# Patient Record
Sex: MALE | Race: WHITE | Employment: UNEMPLOYED | URBAN - METROPOLITAN AREA
[De-identification: names, ages, dates, MRNs, and addresses within clinical notes are randomized per-mention and may not be internally consistent; named-entity substitution may affect disease eponyms.]

---

## 2019-02-18 ENCOUNTER — NURSE ONLY (OUTPATIENT)
Dept: FAMILY MEDICINE CLINIC | Age: 49
End: 2019-02-18

## 2019-02-18 DIAGNOSIS — Z02.83 ENCOUNTER FOR DRUG SCREENING: Primary | ICD-10-CM

## 2019-02-18 PROCEDURE — WM1004 WORKMED DRUG COLLECTION: Performed by: NURSE PRACTITIONER

## 2020-05-14 ENCOUNTER — APPOINTMENT (OUTPATIENT)
Dept: CT IMAGING | Age: 50
DRG: 638 | End: 2020-05-14
Payer: COMMERCIAL

## 2020-05-14 ENCOUNTER — HOSPITAL ENCOUNTER (INPATIENT)
Age: 50
LOS: 2 days | Discharge: HOME | DRG: 638 | End: 2020-05-16
Attending: EMERGENCY MEDICINE | Admitting: INTERNAL MEDICINE
Payer: COMMERCIAL

## 2020-05-14 ENCOUNTER — APPOINTMENT (OUTPATIENT)
Dept: GENERAL RADIOLOGY | Age: 50
DRG: 638 | End: 2020-05-14
Payer: COMMERCIAL

## 2020-05-14 PROBLEM — R79.89 ELEVATED LACTIC ACID LEVEL: Status: ACTIVE | Noted: 2020-05-14

## 2020-05-14 PROBLEM — E11.9 TYPE 2 DIABETES MELLITUS WITHOUT COMPLICATION, WITHOUT LONG-TERM CURRENT USE OF INSULIN (HCC): Status: ACTIVE | Noted: 2020-05-14

## 2020-05-14 PROBLEM — E87.6 HYPOKALEMIA: Status: ACTIVE | Noted: 2020-05-14

## 2020-05-14 PROBLEM — I10 ESSENTIAL HYPERTENSION: Status: ACTIVE | Noted: 2020-05-14

## 2020-05-14 PROBLEM — R55 SYNCOPAL EPISODES: Status: ACTIVE | Noted: 2020-05-14

## 2020-05-14 PROBLEM — R56.9 SEIZURE (HCC): Status: ACTIVE | Noted: 2020-05-14

## 2020-05-14 PROBLEM — R77.8 ELEVATED TROPONIN: Status: ACTIVE | Noted: 2020-05-14

## 2020-05-14 LAB
-: ABNORMAL
ABSOLUTE EOS #: 0 K/UL (ref 0–0.4)
ABSOLUTE IMMATURE GRANULOCYTE: 0.12 K/UL (ref 0–0.3)
ABSOLUTE LYMPH #: 4.48 K/UL (ref 1–4.8)
ABSOLUTE MONO #: 0.24 K/UL (ref 0.1–0.8)
ACETAMINOPHEN LEVEL: <5 UG/ML (ref 10–30)
AMORPHOUS: ABNORMAL
ANION GAP SERPL CALCULATED.3IONS-SCNC: 21 MMOL/L (ref 9–17)
ATYPICAL LYMPHOCYTE ABSOLUTE COUNT: 0.24 K/UL
ATYPICAL LYMPHOCYTES: 2 %
BACTERIA: ABNORMAL
BASOPHILS # BLD: 0 % (ref 0–2)
BASOPHILS ABSOLUTE: 0 K/UL (ref 0–0.2)
BILIRUBIN URINE: NEGATIVE
BNP INTERPRETATION: NORMAL
BUN BLDV-MCNC: 13 MG/DL (ref 6–20)
BUN/CREAT BLD: ABNORMAL (ref 9–20)
CALCIUM IONIZED: 1.08 MMOL/L (ref 1.13–1.33)
CALCIUM SERPL-MCNC: 8.7 MG/DL (ref 8.6–10.4)
CASTS UA: ABNORMAL /LPF (ref 0–8)
CHLORIDE BLD-SCNC: 101 MMOL/L (ref 98–107)
CO2: 18 MMOL/L (ref 20–31)
COLOR: YELLOW
CREAT SERPL-MCNC: 0.94 MG/DL (ref 0.7–1.2)
CRYSTALS, UA: ABNORMAL /HPF
DIFFERENTIAL TYPE: ABNORMAL
EOSINOPHILS RELATIVE PERCENT: 0 % (ref 1–4)
EPITHELIAL CELLS UA: ABNORMAL /HPF (ref 0–5)
ETHANOL PERCENT: <0.01 %
ETHANOL: <10 MG/DL
GFR AFRICAN AMERICAN: >60 ML/MIN
GFR NON-AFRICAN AMERICAN: >60 ML/MIN
GFR SERPL CREATININE-BSD FRML MDRD: ABNORMAL ML/MIN/{1.73_M2}
GFR SERPL CREATININE-BSD FRML MDRD: ABNORMAL ML/MIN/{1.73_M2}
GLUCOSE BLD-MCNC: 111 MG/DL (ref 70–99)
GLUCOSE BLD-MCNC: 99 MG/DL (ref 75–110)
GLUCOSE URINE: NEGATIVE
HCT VFR BLD CALC: 46.6 % (ref 40.7–50.3)
HEMOGLOBIN: 15 G/DL (ref 13–17)
IMMATURE GRANULOCYTES: 1 %
KETONES, URINE: ABNORMAL
LACTIC ACID, WHOLE BLOOD: 5.1 MMOL/L (ref 0.7–2.1)
LACTIC ACID: ABNORMAL MMOL/L
LEUKOCYTE ESTERASE, URINE: NEGATIVE
LYMPHOCYTES # BLD: 37 % (ref 24–44)
MAGNESIUM: 1.7 MG/DL (ref 1.6–2.6)
MCH RBC QN AUTO: 27.9 PG (ref 25.2–33.5)
MCHC RBC AUTO-ENTMCNC: 32.2 G/DL (ref 28.4–34.8)
MCV RBC AUTO: 86.8 FL (ref 82.6–102.9)
MONOCYTES # BLD: 2 % (ref 1–7)
MORPHOLOGY: NORMAL
MUCUS: ABNORMAL
NITRITE, URINE: NEGATIVE
NRBC AUTOMATED: 0 PER 100 WBC
OTHER OBSERVATIONS UA: ABNORMAL
PDW BLD-RTO: 14.3 % (ref 11.8–14.4)
PH UA: 5 (ref 5–8)
PLATELET # BLD: 196 K/UL (ref 138–453)
PLATELET ESTIMATE: ABNORMAL
PMV BLD AUTO: 11.4 FL (ref 8.1–13.5)
POTASSIUM SERPL-SCNC: 3.6 MMOL/L (ref 3.7–5.3)
PRO-BNP: 44 PG/ML
PROTEIN UA: ABNORMAL
RBC # BLD: 5.37 M/UL (ref 4.21–5.77)
RBC # BLD: ABNORMAL 10*6/UL
RBC UA: ABNORMAL /HPF (ref 0–4)
RENAL EPITHELIAL, UA: ABNORMAL /HPF
SALICYLATE LEVEL: <1 MG/DL (ref 3–10)
SEG NEUTROPHILS: 58 % (ref 36–66)
SEGMENTED NEUTROPHILS ABSOLUTE COUNT: 7.02 K/UL (ref 1.8–7.7)
SODIUM BLD-SCNC: 140 MMOL/L (ref 135–144)
SPECIFIC GRAVITY UA: 1.04 (ref 1–1.03)
TOXIC TRICYCLIC SC,BLOOD: NEGATIVE
TRICHOMONAS: ABNORMAL
TROPONIN INTERP: ABNORMAL
TROPONIN INTERP: NORMAL
TROPONIN T: ABNORMAL NG/ML
TROPONIN T: NORMAL NG/ML
TROPONIN, HIGH SENSITIVITY: 18 NG/L (ref 0–22)
TROPONIN, HIGH SENSITIVITY: 43 NG/L (ref 0–22)
TSH SERPL DL<=0.05 MIU/L-ACNC: 3.63 MIU/L (ref 0.3–5)
TURBIDITY: CLEAR
URINE HGB: NEGATIVE
UROBILINOGEN, URINE: NORMAL
WBC # BLD: 12.1 K/UL (ref 3.5–11.3)
WBC # BLD: ABNORMAL 10*3/UL
WBC UA: ABNORMAL /HPF (ref 0–5)
YEAST: ABNORMAL

## 2020-05-14 PROCEDURE — G0480 DRUG TEST DEF 1-7 CLASSES: HCPCS

## 2020-05-14 PROCEDURE — 83930 ASSAY OF BLOOD OSMOLALITY: CPT

## 2020-05-14 PROCEDURE — 81001 URINALYSIS AUTO W/SCOPE: CPT

## 2020-05-14 PROCEDURE — 80076 HEPATIC FUNCTION PANEL: CPT

## 2020-05-14 PROCEDURE — 83605 ASSAY OF LACTIC ACID: CPT

## 2020-05-14 PROCEDURE — 80048 BASIC METABOLIC PNL TOTAL CA: CPT

## 2020-05-14 PROCEDURE — 6360000004 HC RX CONTRAST MEDICATION: Performed by: STUDENT IN AN ORGANIZED HEALTH CARE EDUCATION/TRAINING PROGRAM

## 2020-05-14 PROCEDURE — 83735 ASSAY OF MAGNESIUM: CPT

## 2020-05-14 PROCEDURE — 82330 ASSAY OF CALCIUM: CPT

## 2020-05-14 PROCEDURE — 85025 COMPLETE CBC W/AUTO DIFF WBC: CPT

## 2020-05-14 PROCEDURE — 2580000003 HC RX 258: Performed by: STUDENT IN AN ORGANIZED HEALTH CARE EDUCATION/TRAINING PROGRAM

## 2020-05-14 PROCEDURE — 84484 ASSAY OF TROPONIN QUANT: CPT

## 2020-05-14 PROCEDURE — 71046 X-RAY EXAM CHEST 2 VIEWS: CPT

## 2020-05-14 PROCEDURE — 1200000000 HC SEMI PRIVATE

## 2020-05-14 PROCEDURE — 82947 ASSAY GLUCOSE BLOOD QUANT: CPT

## 2020-05-14 PROCEDURE — 93005 ELECTROCARDIOGRAM TRACING: CPT | Performed by: STUDENT IN AN ORGANIZED HEALTH CARE EDUCATION/TRAINING PROGRAM

## 2020-05-14 PROCEDURE — 84443 ASSAY THYROID STIM HORMONE: CPT

## 2020-05-14 PROCEDURE — 80307 DRUG TEST PRSMV CHEM ANLYZR: CPT

## 2020-05-14 PROCEDURE — 6360000002 HC RX W HCPCS: Performed by: STUDENT IN AN ORGANIZED HEALTH CARE EDUCATION/TRAINING PROGRAM

## 2020-05-14 PROCEDURE — 96374 THER/PROPH/DIAG INJ IV PUSH: CPT

## 2020-05-14 PROCEDURE — 99285 EMERGENCY DEPT VISIT HI MDM: CPT

## 2020-05-14 PROCEDURE — 70450 CT HEAD/BRAIN W/O DYE: CPT

## 2020-05-14 PROCEDURE — 71260 CT THORAX DX C+: CPT

## 2020-05-14 PROCEDURE — 83880 ASSAY OF NATRIURETIC PEPTIDE: CPT

## 2020-05-14 RX ORDER — ACETAMINOPHEN 325 MG/1
650 TABLET ORAL EVERY 4 HOURS PRN
Status: CANCELLED | OUTPATIENT
Start: 2020-05-14

## 2020-05-14 RX ORDER — ONDANSETRON 4 MG/1
8 TABLET, ORALLY DISINTEGRATING ORAL EVERY 8 HOURS PRN
Status: CANCELLED | OUTPATIENT
Start: 2020-05-14

## 2020-05-14 RX ORDER — ONDANSETRON 2 MG/ML
4 INJECTION INTRAMUSCULAR; INTRAVENOUS ONCE
Status: COMPLETED | OUTPATIENT
Start: 2020-05-14 | End: 2020-05-14

## 2020-05-14 RX ORDER — SODIUM CHLORIDE 0.9 % (FLUSH) 0.9 %
10 SYRINGE (ML) INJECTION PRN
Status: CANCELLED | OUTPATIENT
Start: 2020-05-14

## 2020-05-14 RX ORDER — SODIUM CHLORIDE 0.9 % (FLUSH) 0.9 %
10 SYRINGE (ML) INJECTION EVERY 12 HOURS SCHEDULED
Status: CANCELLED | OUTPATIENT
Start: 2020-05-14

## 2020-05-14 RX ORDER — 0.9 % SODIUM CHLORIDE 0.9 %
1000 INTRAVENOUS SOLUTION INTRAVENOUS ONCE
Status: COMPLETED | OUTPATIENT
Start: 2020-05-14 | End: 2020-05-15

## 2020-05-14 RX ADMIN — IOHEXOL 75 ML: 350 INJECTION, SOLUTION INTRAVENOUS at 22:11

## 2020-05-14 RX ADMIN — ONDANSETRON 4 MG: 2 INJECTION INTRAMUSCULAR; INTRAVENOUS at 23:10

## 2020-05-14 RX ADMIN — SODIUM CHLORIDE 1000 ML: 9 INJECTION, SOLUTION INTRAVENOUS at 22:17

## 2020-05-15 ENCOUNTER — APPOINTMENT (OUTPATIENT)
Dept: MRI IMAGING | Age: 50
DRG: 638 | End: 2020-05-15
Payer: COMMERCIAL

## 2020-05-15 LAB
ABSOLUTE EOS #: <0.03 K/UL (ref 0–0.44)
ABSOLUTE IMMATURE GRANULOCYTE: 0.04 K/UL (ref 0–0.3)
ABSOLUTE LYMPH #: 2.27 K/UL (ref 1.1–3.7)
ABSOLUTE MONO #: 0.69 K/UL (ref 0.1–1.2)
ALBUMIN SERPL-MCNC: 3.8 G/DL (ref 3.5–5.2)
ALBUMIN/GLOBULIN RATIO: 1.2 (ref 1–2.5)
ALP BLD-CCNC: 102 U/L (ref 40–129)
ALT SERPL-CCNC: 32 U/L (ref 5–41)
AMPHETAMINE SCREEN URINE: NEGATIVE
ANION GAP SERPL CALCULATED.3IONS-SCNC: 15 MMOL/L (ref 9–17)
AST SERPL-CCNC: 34 U/L
BARBITURATE SCREEN URINE: NEGATIVE
BASOPHILS # BLD: 0 % (ref 0–2)
BASOPHILS ABSOLUTE: <0.03 K/UL (ref 0–0.2)
BENZODIAZEPINE SCREEN, URINE: NEGATIVE
BILIRUB SERPL-MCNC: 0.35 MG/DL (ref 0.3–1.2)
BILIRUBIN DIRECT: 0.1 MG/DL
BILIRUBIN, INDIRECT: 0.25 MG/DL (ref 0–1)
BUN BLDV-MCNC: 12 MG/DL (ref 6–20)
BUN/CREAT BLD: ABNORMAL (ref 9–20)
BUPRENORPHINE URINE: NORMAL
CALCIUM SERPL-MCNC: 8.6 MG/DL (ref 8.6–10.4)
CANNABINOID SCREEN URINE: NEGATIVE
CHLORIDE BLD-SCNC: 106 MMOL/L (ref 98–107)
CO2: 21 MMOL/L (ref 20–31)
COCAINE METABOLITE, URINE: NEGATIVE
CORTISOL COLLECTION INFO: NORMAL
CORTISOL: 13.6 UG/DL (ref 2.7–18.4)
CREAT SERPL-MCNC: 0.67 MG/DL (ref 0.7–1.2)
CREATININE URINE: 87.3 MG/DL (ref 39–259)
DIFFERENTIAL TYPE: ABNORMAL
EKG ATRIAL RATE: 106 BPM
EKG ATRIAL RATE: 115 BPM
EKG ATRIAL RATE: 95 BPM
EKG P AXIS: 32 DEGREES
EKG P AXIS: 40 DEGREES
EKG P AXIS: 52 DEGREES
EKG P-R INTERVAL: 136 MS
EKG P-R INTERVAL: 150 MS
EKG P-R INTERVAL: 160 MS
EKG Q-T INTERVAL: 370 MS
EKG Q-T INTERVAL: 374 MS
EKG Q-T INTERVAL: 376 MS
EKG QRS DURATION: 130 MS
EKG QRS DURATION: 134 MS
EKG QRS DURATION: 134 MS
EKG QTC CALCULATION (BAZETT): 472 MS
EKG QTC CALCULATION (BAZETT): 496 MS
EKG QTC CALCULATION (BAZETT): 511 MS
EKG R AXIS: 32 DEGREES
EKG R AXIS: 40 DEGREES
EKG R AXIS: 74 DEGREES
EKG T AXIS: 11 DEGREES
EKG T AXIS: 12 DEGREES
EKG T AXIS: 23 DEGREES
EKG VENTRICULAR RATE: 106 BPM
EKG VENTRICULAR RATE: 115 BPM
EKG VENTRICULAR RATE: 95 BPM
EOSINOPHILS RELATIVE PERCENT: 0 % (ref 1–4)
ESTIMATED AVERAGE GLUCOSE: 220 MG/DL
GFR AFRICAN AMERICAN: >60 ML/MIN
GFR NON-AFRICAN AMERICAN: >60 ML/MIN
GFR SERPL CREATININE-BSD FRML MDRD: ABNORMAL ML/MIN/{1.73_M2}
GFR SERPL CREATININE-BSD FRML MDRD: ABNORMAL ML/MIN/{1.73_M2}
GLOBULIN: NORMAL G/DL (ref 1.5–3.8)
GLUCOSE BLD-MCNC: 133 MG/DL (ref 75–110)
GLUCOSE BLD-MCNC: 165 MG/DL (ref 70–99)
GLUCOSE BLD-MCNC: 169 MG/DL (ref 75–110)
GLUCOSE BLD-MCNC: 216 MG/DL (ref 75–110)
GLUCOSE BLD-MCNC: 223 MG/DL (ref 75–110)
HBA1C MFR BLD: 9.3 % (ref 4–6)
HCT VFR BLD CALC: 44.1 % (ref 40.7–50.3)
HEMOGLOBIN: 14.4 G/DL (ref 13–17)
IMMATURE GRANULOCYTES: 0 %
LACTIC ACID, WHOLE BLOOD: 1.3 MMOL/L (ref 0.7–2.1)
LV EF: 55 %
LVEF MODALITY: NORMAL
LYMPHOCYTES # BLD: 22 % (ref 24–43)
MCH RBC QN AUTO: 28.1 PG (ref 25.2–33.5)
MCHC RBC AUTO-ENTMCNC: 32.7 G/DL (ref 28.4–34.8)
MCV RBC AUTO: 86 FL (ref 82.6–102.9)
MDMA URINE: NORMAL
METHADONE SCREEN, URINE: NEGATIVE
METHAMPHETAMINE, URINE: NORMAL
MONOCYTES # BLD: 7 % (ref 3–12)
NRBC AUTOMATED: 0 PER 100 WBC
OPIATES, URINE: NEGATIVE
OXYCODONE SCREEN URINE: NEGATIVE
PDW BLD-RTO: 14.5 % (ref 11.8–14.4)
PHENCYCLIDINE, URINE: NEGATIVE
PLATELET # BLD: 183 K/UL (ref 138–453)
PLATELET ESTIMATE: ABNORMAL
PMV BLD AUTO: 11.2 FL (ref 8.1–13.5)
POTASSIUM SERPL-SCNC: 4.1 MMOL/L (ref 3.7–5.3)
PROPOXYPHENE, URINE: NORMAL
RBC # BLD: 5.13 M/UL (ref 4.21–5.77)
RBC # BLD: ABNORMAL 10*6/UL
SEG NEUTROPHILS: 71 % (ref 36–65)
SEGMENTED NEUTROPHILS ABSOLUTE COUNT: 7.51 K/UL (ref 1.5–8.1)
SERUM OSMOLALITY: 293 MOSM/KG (ref 275–295)
SODIUM BLD-SCNC: 142 MMOL/L (ref 135–144)
TEST INFORMATION: NORMAL
TOTAL PROTEIN, URINE: 75 MG/DL
TOTAL PROTEIN: 6.9 G/DL (ref 6.4–8.3)
TRICYCLIC ANTIDEPRESSANTS, UR: NORMAL
TROPONIN INTERP: ABNORMAL
TROPONIN INTERP: ABNORMAL
TROPONIN INTERP: NORMAL
TROPONIN T: ABNORMAL NG/ML
TROPONIN T: ABNORMAL NG/ML
TROPONIN T: NORMAL NG/ML
TROPONIN, HIGH SENSITIVITY: 21 NG/L (ref 0–22)
TROPONIN, HIGH SENSITIVITY: 21 NG/L (ref 0–22)
TROPONIN, HIGH SENSITIVITY: 22 NG/L (ref 0–22)
TROPONIN, HIGH SENSITIVITY: 25 NG/L (ref 0–22)
TROPONIN, HIGH SENSITIVITY: 29 NG/L (ref 0–22)
TSH SERPL DL<=0.05 MIU/L-ACNC: 1.22 MIU/L (ref 0.3–5)
URINE TOTAL PROTEIN CREATININE RATIO: 0.86 (ref 0–0.2)
WBC # BLD: 10.5 K/UL (ref 3.5–11.3)
WBC # BLD: ABNORMAL 10*3/UL

## 2020-05-15 PROCEDURE — 2580000003 HC RX 258: Performed by: STUDENT IN AN ORGANIZED HEALTH CARE EDUCATION/TRAINING PROGRAM

## 2020-05-15 PROCEDURE — 6370000000 HC RX 637 (ALT 250 FOR IP): Performed by: STUDENT IN AN ORGANIZED HEALTH CARE EDUCATION/TRAINING PROGRAM

## 2020-05-15 PROCEDURE — 80048 BASIC METABOLIC PNL TOTAL CA: CPT

## 2020-05-15 PROCEDURE — 93306 TTE W/DOPPLER COMPLETE: CPT

## 2020-05-15 PROCEDURE — 97165 OT EVAL LOW COMPLEX 30 MIN: CPT

## 2020-05-15 PROCEDURE — 96372 THER/PROPH/DIAG INJ SC/IM: CPT

## 2020-05-15 PROCEDURE — 95819 EEG AWAKE AND ASLEEP: CPT

## 2020-05-15 PROCEDURE — G0378 HOSPITAL OBSERVATION PER HR: HCPCS

## 2020-05-15 PROCEDURE — 82570 ASSAY OF URINE CREATININE: CPT

## 2020-05-15 PROCEDURE — 84484 ASSAY OF TROPONIN QUANT: CPT

## 2020-05-15 PROCEDURE — 84156 ASSAY OF PROTEIN URINE: CPT

## 2020-05-15 PROCEDURE — 85025 COMPLETE CBC W/AUTO DIFF WBC: CPT

## 2020-05-15 PROCEDURE — 82533 TOTAL CORTISOL: CPT

## 2020-05-15 PROCEDURE — 97535 SELF CARE MNGMENT TRAINING: CPT

## 2020-05-15 PROCEDURE — 93005 ELECTROCARDIOGRAM TRACING: CPT | Performed by: STUDENT IN AN ORGANIZED HEALTH CARE EDUCATION/TRAINING PROGRAM

## 2020-05-15 PROCEDURE — 6360000004 HC RX CONTRAST MEDICATION: Performed by: STUDENT IN AN ORGANIZED HEALTH CARE EDUCATION/TRAINING PROGRAM

## 2020-05-15 PROCEDURE — 84443 ASSAY THYROID STIM HORMONE: CPT

## 2020-05-15 PROCEDURE — 95819 EEG AWAKE AND ASLEEP: CPT | Performed by: PSYCHIATRY & NEUROLOGY

## 2020-05-15 PROCEDURE — 36415 COLL VENOUS BLD VENIPUNCTURE: CPT

## 2020-05-15 PROCEDURE — A9579 GAD-BASE MR CONTRAST NOS,1ML: HCPCS | Performed by: STUDENT IN AN ORGANIZED HEALTH CARE EDUCATION/TRAINING PROGRAM

## 2020-05-15 PROCEDURE — 6360000002 HC RX W HCPCS: Performed by: STUDENT IN AN ORGANIZED HEALTH CARE EDUCATION/TRAINING PROGRAM

## 2020-05-15 PROCEDURE — 82947 ASSAY GLUCOSE BLOOD QUANT: CPT

## 2020-05-15 PROCEDURE — 83036 HEMOGLOBIN GLYCOSYLATED A1C: CPT

## 2020-05-15 PROCEDURE — 70553 MRI BRAIN STEM W/O & W/DYE: CPT

## 2020-05-15 PROCEDURE — 99223 1ST HOSP IP/OBS HIGH 75: CPT | Performed by: PSYCHIATRY & NEUROLOGY

## 2020-05-15 PROCEDURE — 1200000000 HC SEMI PRIVATE

## 2020-05-15 PROCEDURE — 83605 ASSAY OF LACTIC ACID: CPT

## 2020-05-15 PROCEDURE — 96375 TX/PRO/DX INJ NEW DRUG ADDON: CPT

## 2020-05-15 PROCEDURE — 93010 ELECTROCARDIOGRAM REPORT: CPT | Performed by: INTERNAL MEDICINE

## 2020-05-15 RX ORDER — SODIUM CHLORIDE 0.9 % (FLUSH) 0.9 %
10 SYRINGE (ML) INJECTION EVERY 12 HOURS SCHEDULED
Status: DISCONTINUED | OUTPATIENT
Start: 2020-05-15 | End: 2020-05-16 | Stop reason: HOSPADM

## 2020-05-15 RX ORDER — DEXTROSE MONOHYDRATE 25 G/50ML
12.5 INJECTION, SOLUTION INTRAVENOUS PRN
Status: DISCONTINUED | OUTPATIENT
Start: 2020-05-15 | End: 2020-05-16 | Stop reason: HOSPADM

## 2020-05-15 RX ORDER — LORAZEPAM 2 MG/ML
1 INJECTION INTRAMUSCULAR ONCE
Status: COMPLETED | OUTPATIENT
Start: 2020-05-15 | End: 2020-05-15

## 2020-05-15 RX ORDER — NICOTINE POLACRILEX 4 MG
15 LOZENGE BUCCAL PRN
Status: DISCONTINUED | OUTPATIENT
Start: 2020-05-15 | End: 2020-05-16 | Stop reason: HOSPADM

## 2020-05-15 RX ORDER — POTASSIUM CHLORIDE 20 MEQ/1
40 TABLET, EXTENDED RELEASE ORAL ONCE
Status: COMPLETED | OUTPATIENT
Start: 2020-05-15 | End: 2020-05-15

## 2020-05-15 RX ORDER — SODIUM CHLORIDE 0.9 % (FLUSH) 0.9 %
10 SYRINGE (ML) INJECTION PRN
Status: DISCONTINUED | OUTPATIENT
Start: 2020-05-15 | End: 2020-05-16 | Stop reason: HOSPADM

## 2020-05-15 RX ORDER — LORAZEPAM 1 MG/1
1 TABLET ORAL PRN
Status: DISCONTINUED | OUTPATIENT
Start: 2020-05-15 | End: 2020-05-16 | Stop reason: HOSPADM

## 2020-05-15 RX ORDER — GEMFIBROZIL 600 MG/1
600 TABLET, FILM COATED ORAL
Status: DISCONTINUED | OUTPATIENT
Start: 2020-05-15 | End: 2020-05-15

## 2020-05-15 RX ORDER — POLYETHYLENE GLYCOL 3350 17 G/17G
17 POWDER, FOR SOLUTION ORAL DAILY PRN
Status: DISCONTINUED | OUTPATIENT
Start: 2020-05-15 | End: 2020-05-16 | Stop reason: HOSPADM

## 2020-05-15 RX ORDER — SODIUM CHLORIDE, SODIUM LACTATE, POTASSIUM CHLORIDE, AND CALCIUM CHLORIDE .6; .31; .03; .02 G/100ML; G/100ML; G/100ML; G/100ML
1000 INJECTION, SOLUTION INTRAVENOUS ONCE
Status: COMPLETED | OUTPATIENT
Start: 2020-05-15 | End: 2020-05-15

## 2020-05-15 RX ORDER — ACETAMINOPHEN 325 MG/1
650 TABLET ORAL EVERY 6 HOURS PRN
Status: DISCONTINUED | OUTPATIENT
Start: 2020-05-15 | End: 2020-05-16 | Stop reason: HOSPADM

## 2020-05-15 RX ORDER — DEXTROSE MONOHYDRATE 50 MG/ML
100 INJECTION, SOLUTION INTRAVENOUS PRN
Status: DISCONTINUED | OUTPATIENT
Start: 2020-05-15 | End: 2020-05-16 | Stop reason: HOSPADM

## 2020-05-15 RX ORDER — PANTOPRAZOLE SODIUM 40 MG/1
40 TABLET, DELAYED RELEASE ORAL DAILY
Status: DISCONTINUED | OUTPATIENT
Start: 2020-05-15 | End: 2020-05-16 | Stop reason: HOSPADM

## 2020-05-15 RX ORDER — AMLODIPINE BESYLATE 10 MG/1
10 TABLET ORAL DAILY
Status: DISCONTINUED | OUTPATIENT
Start: 2020-05-15 | End: 2020-05-16

## 2020-05-15 RX ORDER — LORAZEPAM 1 MG/1
1 TABLET ORAL ONCE
Status: DISCONTINUED | OUTPATIENT
Start: 2020-05-15 | End: 2020-05-15

## 2020-05-15 RX ORDER — FUROSEMIDE 40 MG/1
40 TABLET ORAL 2 TIMES DAILY
Status: DISCONTINUED | OUTPATIENT
Start: 2020-05-15 | End: 2020-05-16

## 2020-05-15 RX ORDER — LOSARTAN POTASSIUM 50 MG/1
100 TABLET ORAL DAILY
Status: DISCONTINUED | OUTPATIENT
Start: 2020-05-15 | End: 2020-05-16 | Stop reason: HOSPADM

## 2020-05-15 RX ORDER — ATORVASTATIN CALCIUM 20 MG/1
20 TABLET, FILM COATED ORAL DAILY
Status: DISCONTINUED | OUTPATIENT
Start: 2020-05-15 | End: 2020-05-16 | Stop reason: HOSPADM

## 2020-05-15 RX ORDER — SODIUM CHLORIDE 0.9 % (FLUSH) 0.9 %
10 SYRINGE (ML) INJECTION 2 TIMES DAILY
Status: DISCONTINUED | OUTPATIENT
Start: 2020-05-15 | End: 2020-05-16 | Stop reason: HOSPADM

## 2020-05-15 RX ORDER — LABETALOL 20 MG/4 ML (5 MG/ML) INTRAVENOUS SYRINGE
5 EVERY 6 HOURS PRN
Status: DISCONTINUED | OUTPATIENT
Start: 2020-05-16 | End: 2020-05-16 | Stop reason: HOSPADM

## 2020-05-15 RX ORDER — ACETAMINOPHEN 650 MG/1
650 SUPPOSITORY RECTAL EVERY 6 HOURS PRN
Status: DISCONTINUED | OUTPATIENT
Start: 2020-05-15 | End: 2020-05-16 | Stop reason: HOSPADM

## 2020-05-15 RX ADMIN — ATORVASTATIN CALCIUM 20 MG: 20 TABLET, FILM COATED ORAL at 14:36

## 2020-05-15 RX ADMIN — LOSARTAN POTASSIUM 100 MG: 50 TABLET, FILM COATED ORAL at 14:36

## 2020-05-15 RX ADMIN — INSULIN LISPRO 4 UNITS: 100 INJECTION, SOLUTION INTRAVENOUS; SUBCUTANEOUS at 17:00

## 2020-05-15 RX ADMIN — ENOXAPARIN SODIUM 40 MG: 40 INJECTION SUBCUTANEOUS at 17:00

## 2020-05-15 RX ADMIN — SODIUM CHLORIDE, POTASSIUM CHLORIDE, SODIUM LACTATE AND CALCIUM CHLORIDE 1000 ML: 600; 310; 30; 20 INJECTION, SOLUTION INTRAVENOUS at 01:36

## 2020-05-15 RX ADMIN — PANTOPRAZOLE SODIUM 40 MG: 40 TABLET, DELAYED RELEASE ORAL at 14:36

## 2020-05-15 RX ADMIN — FUROSEMIDE 40 MG: 40 TABLET ORAL at 16:59

## 2020-05-15 RX ADMIN — GADOTERIDOL 20 ML: 279.3 INJECTION, SOLUTION INTRAVENOUS at 12:38

## 2020-05-15 RX ADMIN — INSULIN LISPRO 2 UNITS: 100 INJECTION, SOLUTION INTRAVENOUS; SUBCUTANEOUS at 22:06

## 2020-05-15 RX ADMIN — Medication 10 ML: at 17:01

## 2020-05-15 RX ADMIN — Medication 10 ML: at 11:47

## 2020-05-15 RX ADMIN — AMLODIPINE BESYLATE 10 MG: 10 TABLET ORAL at 23:57

## 2020-05-15 RX ADMIN — LORAZEPAM 1 MG: 2 INJECTION INTRAMUSCULAR; INTRAVENOUS at 11:47

## 2020-05-15 RX ADMIN — POTASSIUM CHLORIDE 40 MEQ: 1500 TABLET, EXTENDED RELEASE ORAL at 01:36

## 2020-05-15 ASSESSMENT — PAIN SCALES - GENERAL
PAINLEVEL_OUTOF10: 0

## 2020-05-15 NOTE — ED PROVIDER NOTES
shortness of breath. He denies any nausea vomiting. Patient states he was feeling well prior to the episode. Physical:   height is 6' (1.829 m) and weight is 340 lb (154.2 kg) (abnormal). His oral temperature is 98.8 °F (37.1 °C). His blood pressure is 154/93 (abnormal) and his pulse is 115. His respiration is 19 and oxygen saturation is 91%. Awake alert talking moving all extremities with no focal neurological deficit. Patient thought it was 2019 otherwise knew the month days a week location and the president of [employer]. He is tachycardic. During my initial evaluation the patient had what appeared to be a room air saturation of 8889% on room air with a good waveform. Impression: Seizures    Plan: Oxygen, EKG, CBC, BMP, troponin, CT scan      EKG Interpretation    Interpreted by me  Sinus tachycardia ventricular 115, normal CO interval, right bundle branch block, normal axis, prolonged QT corrected  No old EKG for comparison      (Please note that portions of this note were completed with a voice recognition program.  Efforts were made to edit the dictations but occasionally words are mis-transcribed.)    Etelvina Isaac.  Yolanda Blanc MD, [facility],[address]  Attending Emergency Medicine Physician        Millicent Borja MD  05/14/20 2048

## 2020-05-15 NOTE — PROGRESS NOTES
Patient Tolerated treatment well  Safety Devices  Safety Devices in place: Yes  Type of devices: Left in bed;Call light within reach;Nurse notified;Gait belt  Restraints  Initially in place: No         Patient Diagnosis(es): The primary encounter diagnosis was Seizure ([address] A diagnosis of Hypoxia was also pertinent to this visit. has a past medical history of Type II or unspecified type diabetes mellitus without mention of complication, not stated as uncontrolled. has a past surgical history that includes Abdominal exploration surgery and Appendectomy.     Treatment Diagnosis: Seizures       Restrictions  Restrictions/Precautions  Restrictions/Precautions: General Precautions, Fall Risk  Required Braces or Orthoses?: No    Subjective   General  Patient assessed for rehabilitation services?: Yes  Family / Caregiver Present: No  Patient Currently in Pain: Denies    Oxygen Therapy  SpO2: 97 %    Social/Functional History  Social/Functional History  Lives With: Family(3 daughters )  Type of Home: House  Home Layout: One level  Home Access: Stairs to enter with rails  Entrance Stairs - Number of Steps: 4 SRINI  Entrance Stairs - Rails: Right  Bathroom Shower/Tub: Tub/Shower unit  Bathroom Toilet: Standard  Bathroom Equipment: Shower chair(was not using prior to admission )  Home Equipment: Rolling walker(not using prior to admission )  Receives Help From: Family  ADL Assistance: Independent  Homemaking Assistance: Independent  Homemaking Responsibilities: Yes  Meal Prep Responsibility: Primary  Laundry Responsibility: Primary  Cleaning Responsibility: Primary  Shopping Responsibility: Primary  Ambulation Assistance: Independent  Transfer Assistance: Independent  Active : Yes  Mode of Transportation: Car  Occupation: Full time employment  Type of occupation:     Objective   Vision: Impaired  Vision Exceptions: Wears glasses at all times  Hearing: Within functional limits Orientation  Overall Orientation Status: Within Normal Limits  Observation/Palpation  Posture: Good  Balance  Sitting Balance: Independent  Standing Balance: Independent(no AD )  Standing Balance  Time: 5 min  Activity: sit <> stand transfer, functional mobility into bathroom   Comment: Pt demo no LOB or SOB   Functional Mobility  Functional - Mobility Device: No device  Activity: To/from bathroom; Other  Assist Level: Independent  Functional Mobility Comments: No LOB or SOB      ADL  Feeding: Independent  Grooming: Independent(to complete oral care and wash face in bathroom )  UE Bathing: Independent  LE Bathing: Independent  UE Dressing: Independent  LE Dressing: Independent(to don tennis shoes seated EOB )  Toileting: Independent  Additional Comments: Pt supine in bed on arrival. Pt completed bed mobility and sat at EOB to don shoes. Pt completed sit > stand transfer and functional mobility into bathroom for oral care, combing hair and washing face. Pt demo no LOB or SOB during session. Pt reports baseline functional level and reports no further acute care OT needs. Pt returned to bed, call light in reach and RN notified on therapist exit. Tone RUE  RUE Tone: Normotonic  Tone LUE  LUE Tone: Normotonic  Coordination  Movements Are Fluid And Coordinated: Yes        Bed mobility  Rolling to Left: Independent  Rolling to Right: Independent  Supine to Sit: Independent  Sit to Supine: Independent  Scooting: Independent  Comment: HOB raised, did not need use of bed rails      Transfers  Stand Step Transfers: Independent  Sit to stand: Independent  Stand to sit:  Independent        Cognition  Overall Cognitive Status: WNL     Perception  Overall Perceptual Status: WFL        Sensation  Overall Sensation Status: WFL(Pt denies numbness/ tingling )        LUE AROM (degrees)  LUE AROM : WFL  RUE AROM (degrees)  RUE AROM : WFL  LUE Strength  Gross LUE Strength: WFL  RUE Strength  Gross RUE Strength: WFL    Plan

## 2020-05-15 NOTE — PROGRESS NOTES
Physical Therapy  DATE: 5/15/2020    NAME: Amado Gutierrez  MRN: [id number]   : 1970    Patient not seen this date for Physical Therapy due to:  [] Blood transfusion in progress  [] Hemodialysis  []  Patient Declined  [] Spine Precautions   [] Strict Bedrest  [] Surgery/ Procedure  [] Testing      [] Other        [x] PT being discontinued at this time. Patient independent. No further needs. Pt denies PT needs; defer PT eval d/t pt independence. [] PT being discontinued at this time as the patient has been transferred to palliative care. No further needs.     Michelle Munoz, PT

## 2020-05-15 NOTE — PROGRESS NOTES
[facility]   [address] [place] [address]    Progress note             Date:   5/15/2020  Patient name:  Yoel Pittman  Date of admission:  5/14/2020  8:13 PM  MRN:   [id number]  Account:  [de-identified]  YOB: 1970  PCP:    No primary care provider on file. Room:   [address] [place] [address]  Code Status:    Full Code    Chief Complaint:     Chief Complaint   Patient presents with    Seizures     Hx of 2 seizures, no meds       Interval hx: The Patient was seen and examined at bedside  Is vitally stable alert oriented x3  No acute events overnight  The patient stated that he is back to baseline  Pending MRI, EEG and echo    Brief History of Present Illness: The patient is a 52 y.o. Non-/non  male who presents with Seizures (Hx of 2 seizures, no meds)   and he is admitted to the hospital for the management of syncope/seizures. The patient mentioned above with past medical history of diabetes mellitus, was admitted to the hospital due to syncope/seizures. The patient stated that he started to have lightheadedness when loss of consciousness that was associated with generalized seizures associated with tongue bite but no urinary incontinence. The patient stated that the symptoms usually accompanied with low blood sugar. History of similar episodes in the past with 1 of them associated with low blood sugar. CT head was unremarkable. The patient was admitted to hospital for syncope/seizure work-up. Past Medical History:     Past Medical History:   Diagnosis Date    Type II or unspecified type diabetes mellitus without mention of complication, not stated as uncontrolled         Past Surgical History:     Past Surgical History:   Procedure Laterality Date    ABDOMINAL EXPLORATION SURGERY      APPENDECTOMY          Medications Prior to Admission:     Prior to Admission medications    Medication Sig Start Date End Date Taking?  Authorizing Provider metFORMIN (GLUCOPHAGE) 850 MG tablet Take 850 mg by mouth 3 times daily. Historical Provider, MD   gemfibrozil (LOPID) 600 MG tablet Take 600 mg by mouth 2 times daily (before meals). Historical Provider, MD   furosemide (LASIX) 40 MG tablet Take 40 mg by mouth 2 times daily. Historical Provider, MD   omeprazole (PRILOSEC) 20 MG capsule Take 20 mg by mouth daily. Historical Provider, MD   atorvastatin (LIPITOR) 20 MG tablet Take 20 mg by mouth daily. Historical Provider, MD        Allergies:     Patient has no known allergies. Social History:     Tobacco:    reports that he has never smoked. He has never used smokeless tobacco.  Alcohol:      reports no history of alcohol use. Drug Use:  reports no history of drug use. Family History:     History reviewed. No pertinent family history. Review of Systems:     ROS:  Constitutional  Negative for fever and chills    HEENT  Negative for ear discharge, ear pain, nosebleed    Eyes  Negative for photophobia, pain and discharge    Respiratory  Negative for hemoptysis and sputum    Cardiovascular  Negative for orthopnea, claudication and PND    Gastrointestinal  Negative for abdominal pain, diarrhea, blood in stool    Musculoskeletal  Negative for joint pain, negative for myalgia    Neurology  syncope/seizures   Skin  Negative for rash or itching    Endo/heme/allergies  Negative for polydipsia, environmental allergy    Psychiatric/behavioral  Negative for suicidal ideation.  Patient is not anxious        Physical Exam:   BP (!) 167/80   Pulse 91   Temp 97.7 °F (36.5 °C)   Resp 18   Ht 6' (1.829 m)   Wt (!) 340 lb (154.2 kg)   SpO2 97%   BMI 46.11 kg/m²   Temp (24hrs), Av °F (36.7 °C), Min:97.6 °F (36.4 °C), Max:98.8 °F (37.1 °C)    Recent Labs     05/14/20  2038 05/15/20  0128 05/15/20  0730   POCGLU 99 133* 169*     No intake or output data in the 24 hours ending 05/15/20 0938      NEUROLOGIC EXAMINATION  GENERAL  Appears comfortable and Immature Granulocytes 1 (H) 0 %    Seg Neutrophils 58 36 - 66 %    Lymphocytes 37 24 - 44 %    Atypical Lymphocytes 2 %    Monocytes 2 1 - 7 %    Eosinophils % 0 (L) 1 - 4 %    Basophils 0 0 - 2 %    Absolute Immature Granulocyte 0.12 0.00 - 0.30 k/uL    Segs Absolute 7.02 1.8 - 7.7 k/uL    Absolute Lymph # 4.48 1.0 - 4.8 k/uL    Atypical Lymphocytes Absolute 0.24 k/uL    Absolute Mono # 0.24 0.1 - 0.8 k/uL    Absolute Eos # 0.00 0.0 - 0.4 k/uL    Basophils Absolute 0.00 0.0 - 0.2 k/uL    Morphology Normal    Basic Metabolic Panel    Collection Time: 05/14/20  8:37 PM   Result Value Ref Range    Glucose 111 (H) 70 - 99 mg/dL    BUN 13 6 - 20 mg/dL    CREATININE 0.94 0.70 - 1.20 mg/dL    Bun/Cre Ratio NOT REPORTED 9 - 20    Calcium 8.7 8.6 - 10.4 mg/dL    Sodium 140 135 - 144 mmol/L    Potassium 3.6 (L) 3.7 - 5.3 mmol/L    Chloride 101 98 - 107 mmol/L    CO2 18 (L) 20 - 31 mmol/L    Anion Gap 21 (H) 9 - 17 mmol/L    GFR Non-African American >60 >60 mL/min    GFR African American >60 >60 mL/min    GFR Comment          GFR Staging NOT REPORTED    Magnesium    Collection Time: 05/14/20  8:37 PM   Result Value Ref Range    Magnesium 1.7 1.6 - 2.6 mg/dL   TSH with Reflex    Collection Time: 05/14/20  8:37 PM   Result Value Ref Range    TSH 3.63 0.30 - 5.00 mIU/L   Brain Natriuretic Peptide    Collection Time: 05/14/20  8:37 PM   Result Value Ref Range    Pro-BNP 44 <300 pg/mL    BNP Interpretation Pro-BNP Reference Range:    CALCIUM, IONIZED    Collection Time: 05/14/20  8:37 PM   Result Value Ref Range    Calcium, Ion 1.08 (L) 1.13 - 1.33 mmol/L   TOX SCR, BLD, ED    Collection Time: 05/14/20  8:37 PM   Result Value Ref Range    Ethanol <10 <10 mg/dL    Ethanol percent <7.463 <0.171 %    Salicylate Lvl <1 (L) 3 - 10 mg/dL    Acetaminophen Level <5 (L) 10 - 30 ug/mL    Toxic Tricyclic Sc,Blood NEGATIVE NEGATIVE   Troponin    Collection Time: 05/14/20  8:37 PM   Result Value Ref Range    Troponin, High Sensitivity 18 0 mmol/L    GFR Non-African American >60 >60 mL/min    GFR African American >60 >60 mL/min    GFR Comment          GFR Staging NOT REPORTED    CBC auto differential    Collection Time: 05/15/20  4:00 AM   Result Value Ref Range    WBC 10.5 3.5 - 11.3 k/uL    RBC 5.13 4.21 - 5.77 m/uL    Hemoglobin 14.4 13.0 - 17.0 g/dL    Hematocrit 44.1 40.7 - 50.3 %    MCV 86.0 82.6 - 102.9 fL    MCH 28.1 25.2 - 33.5 pg    MCHC 32.7 28.4 - 34.8 g/dL    RDW 14.5 (H) 11.8 - 14.4 %    Platelets 527 746 - 603 k/uL    MPV 11.2 8.1 - 13.5 fL    NRBC Automated 0.0 0.0 per 100 WBC    Differential Type NOT REPORTED     WBC Morphology NOT REPORTED     RBC Morphology ANISOCYTOSIS PRESENT     Platelet Estimate NOT REPORTED     Seg Neutrophils 71 (H) 36 - 65 %    Lymphocytes 22 (L) 24 - 43 %    Monocytes 7 3 - 12 %    Eosinophils % 0 (L) 1 - 4 %    Basophils 0 0 - 2 %    Immature Granulocytes 0 0 %    Segs Absolute 7.51 1.50 - 8.10 k/uL    Absolute Lymph # 2.27 1.10 - 3.70 k/uL    Absolute Mono # 0.69 0.10 - 1.20 k/uL    Absolute Eos # <0.03 0.00 - 0.44 k/uL    Basophils Absolute <0.03 0.00 - 0.20 k/uL    Absolute Immature Granulocyte 0.04 0.00 - 0.30 k/uL   Troponin    Collection Time: 05/15/20  4:00 AM   Result Value Ref Range    Troponin, High Sensitivity 29 (H) 0 - 22 ng/L    Troponin T NOT REPORTED <0.03 ng/mL    Troponin Interp NOT REPORTED    EKG 12 lead    Collection Time: 05/15/20  6:29 AM   Result Value Ref Range    Ventricular Rate 95 BPM    Atrial Rate 95 BPM    P-R Interval 160 ms    QRS Duration 134 ms    Q-T Interval 376 ms    QTc Calculation (Bazett) 472 ms    P Axis 52 degrees    R Axis 40 degrees    T Axis 12 degrees   POC Glucose Fingerstick    Collection Time: 05/15/20  7:30 AM   Result Value Ref Range    POC Glucose 169 (H) 75 - 110 mg/dL   LACTIC ACID, WHOLE BLOOD    Collection Time: 05/15/20  9:23 AM   Result Value Ref Range    Lactic Acid, Whole Blood 1.3 0.7 - 2.1 mmol/L         Assessment :      Primary Problem  Syncopal episodes    Active Hospital Problems    Diagnosis Date Noted    Syncopal episodes [R55] 05/14/2020    Type 2 diabetes mellitus without complication, without long-term current use of insulin ([facility] [E11.9] 05/14/2020    Elevated troponin [R79.89] 05/14/2020    Elevated lactic acid level [R79.89] 05/14/2020    Hypokalemia [E87.6] 05/14/2020    Essential hypertension [I10] 05/14/2020    Seizure ([facility] [R56.9] 05/14/2020       Plan:     The patient mentioned above with past medical history of diabetes mellitus, was admitted to the hospital due to syncope/seizures. DD;   Convulsive syncope  Seizures    CT head was unremarkable  Pending MRI of the brain  Pending EEG  Pending echo  Orthostatic were unremarkable  UA/UDS were unremarkable  PT/OT      Follow-up further recommendations after discussing the case with attending  The plan was discussed with the patient, patient's family and the medical staff. Consultations:   IP CONSULT TO NEUROLOGY  IP CONSULT TO INTERNAL MEDICINE  IP CONSULT TO CASE MANAGEMENT    Patient is admitted as inpatient status because of co-morbidities listed above, severity of signs and symptoms as outlined, requirement for current medical therapies and most importantly because of direct risk to patient if care not provided in a hospital setting. Alessandra Butler MD  5/15/2020  9:38 AM    Copy sent to Dr. Tomas primary care provider on file.

## 2020-05-15 NOTE — ED NOTES
Bed: 30  Expected date: 5/14/20  Expected time: 8:01 PM  Means of arrival: [facility]  Comments:  LS11  seizure     Chavez Sequeira RN  05/14/20 2013

## 2020-05-15 NOTE — ED PROVIDER NOTES
Dr Eduarda Upton sign out, seizure, hx seizure d/o, neuro consulted, >> admit      Mirza Caruso, DO  05/15/20 7313

## 2020-05-15 NOTE — ED PROVIDER NOTES
[RB]   3070 EKG repeat grossly unremarkable. [RB]      ED Course User Index  [RB] Gregg Banuelos, DO         DIAGNOSTIC RESULTS / EMERGENCYDEPARTMENT COURSE   LABS:  Labs Reviewed   CBC WITH AUTO DIFFERENTIAL - Abnormal; Notable for the following components:       Result Value    WBC 12.1 (*)     Immature Granulocytes 1 (*)     Eosinophils % 0 (*)     All other components within normal limits   BASIC METABOLIC PANEL - Abnormal; Notable for the following components:    Glucose 111 (*)     Potassium 3.6 (*)     CO2 18 (*)     Anion Gap 21 (*)     All other components within normal limits   TROPONIN - Abnormal; Notable for the following components:    Troponin, High Sensitivity 43 (*)     All other components within normal limits   CALCIUM, IONIZED - Abnormal; Notable for the following components:    Calcium, Ion 1.08 (*)     All other components within normal limits   TOX SCR, BLD, ED - Abnormal; Notable for the following components:    Salicylate Lvl <1 (*)     Acetaminophen Level <5 (*)     All other components within normal limits   LACTIC ACID, PLASMA - Abnormal; Notable for the following components:    Lactic Acid, Whole Blood 5.1 (*)     All other components within normal limits   URINALYSIS WITH MICROSCOPIC - Abnormal; Notable for the following components:    Ketones, Urine TRACE (*)     Specific Gravity, UA 1.037 (*)     Protein, UA 3+ (*)     All other components within normal limits   MAGNESIUM   TSH WITH REFLEX   BRAIN NATRIURETIC PEPTIDE   TROPONIN   URINE DRUG SCREEN   POC GLUCOSE FINGERSTICK       RADIOLOGY:  Xr Chest Standard (2 Vw)    Result Date: 5/14/2020  EXAMINATION: TWO XRAY VIEWS OF THE CHEST 5/14/2020 9:19 pm COMPARISON: 22 March 2012 HISTORY: ORDERING SYSTEM PROVIDED HISTORY: r/o aspiration TECHNOLOGIST PROVIDED HISTORY: r/o aspiration Post seizure FINDINGS: AP portable view of the chest time stamped at 2112 hours is submitted.   Heart size is normal.  No vascular congestion, focal Main pulmonary artery is normal in caliber. Mediastinum: No evidence of mediastinal lymphadenopathy. The heart is moderately enlarged with otherwise unremarkable configuration. No evidence of pericardial effusion is seen. There is no acute abnormality of the thoracic aorta. Lungs/pleura: The lungs are without acute process. No focal consolidation or pulmonary edema. No evidence of pleural effusion or pneumothorax. Upper Abdomen: Limited images of the upper abdomen are unremarkable. Soft Tissues/Bones: No acute bone or soft tissue abnormality. 1. No evidence of acute pulmonary embolism. 2. Moderate cardiomegaly. CONSULTS:  IP CONSULT TO NEUROLOGY  IP CONSULT TO INTERNAL MEDICINE    CRITICAL CARE:  Please see attending note    FINAL IMPRESSION     1. Seizure ([address]    2. Hypoxia          DISPOSITION / PLAN   DISPOSITION          PATIENT REFERRED TO:  No follow-up provider specified. DISCHARGE MEDICATIONS:  New Prescriptions    No medications on file       Dr. Amita Novoa.  1968 [facility]  Emergency Medicine Resident Physician, PGY-2    (Please note that portions of this note were completed with a voice recognition program.  Efforts were made to edit the dictations but occasionally words are mis-transcribed.)         Stacia Alejandra DO  Resident  05/14/20 6503

## 2020-05-15 NOTE — H&P
unspecified type diabetes mellitus without mention of complication, not stated as uncontrolled        PAST SURGICAL HISTORY     Past Surgical History:   Procedure Laterality Date    ABDOMINAL EXPLORATION SURGERY      APPENDECTOMY         ALLERGIES     Patient has no known allergies. MEDICATIONS PRIOR TO ADMISSION     Prior to Admission medications    Medication Sig Start Date End Date Taking? Authorizing Provider   metFORMIN (GLUCOPHAGE) 850 MG tablet Take 850 mg by mouth 3 times daily. Historical Provider, MD   gemfibrozil (LOPID) 600 MG tablet Take 600 mg by mouth 2 times daily (before meals). Historical Provider, MD   furosemide (LASIX) 40 MG tablet Take 40 mg by mouth 2 times daily. Historical Provider, MD   omeprazole (PRILOSEC) 20 MG capsule Take 20 mg by mouth daily. Historical Provider, MD   atorvastatin (LIPITOR) 20 MG tablet Take 20 mg by mouth daily. Historical Provider, MD       SOCIAL HISTORY     Patient denies smoking cigarettes, alcohol consumption or substance use. FAMILY HISTORY     History reviewed. No pertinent family history. PHYSICAL EXAM     Vitals: /78   Pulse 102   Temp 97.6 °F (36.4 °C) (Oral)   Resp 16   Ht 6' (1.829 m)   Wt (!) 340 lb (154.2 kg)   SpO2 96%   BMI 46.11 kg/m²   Tmax: Temp (24hrs), Av.2 °F (36.8 °C), Min:97.6 °F (36.4 °C), Max:98.8 °F (37.1 °C)    Last Body weight:   Wt Readings from Last 3 Encounters:   20 (!) 340 lb (154.2 kg)   10/29/14 (!) 340 lb (154.2 kg)     Body Mass Index : Body mass index is 46.11 kg/m². PHYSICAL EXAMINATION:  Constitutional: This is a well developed, well nourished,  52y.o. year old male who is alert, oriented, cooperative and in no apparent distress. Head:normocephalic and atraumatic. Neck No elevated JVP. Trachea was midline. Respiratory: Chest was symmetrical without dullness to percussion. Breath sounds bilaterally were clear to auscultation.    Cardiovascular: Regular without murmur, clicks, gallops or rubs. Abdomen: Slightly rounded and soft without organomegaly. No rebound, rigidity or guarding was appreciated.     Extremities: Bilateral pedal edema present neurological/Psychiatric: The patient's general behavior, level of consciousness, thought content and emotional status is normal.          INVESTIGATIONS     Laboratory Testing:     Recent Results (from the past 24 hour(s))   CBC Auto Differential    Collection Time: 05/14/20  8:37 PM   Result Value Ref Range    WBC 12.1 (H) 3.5 - 11.3 k/uL    RBC 5.37 4.21 - 5.77 m/uL    Hemoglobin 15.0 13.0 - 17.0 g/dL    Hematocrit 46.6 40.7 - 50.3 %    MCV 86.8 82.6 - 102.9 fL    MCH 27.9 25.2 - 33.5 pg    MCHC 32.2 28.4 - 34.8 g/dL    RDW 14.3 11.8 - 14.4 %    Platelets 552 125 - 353 k/uL    MPV 11.4 8.1 - 13.5 fL    NRBC Automated 0.0 0.0 per 100 WBC    Differential Type NOT REPORTED     WBC Morphology NOT REPORTED     RBC Morphology NOT REPORTED     Platelet Estimate NOT REPORTED     Immature Granulocytes 1 (H) 0 %    Seg Neutrophils 58 36 - 66 %    Lymphocytes 37 24 - 44 %    Atypical Lymphocytes 2 %    Monocytes 2 1 - 7 %    Eosinophils % 0 (L) 1 - 4 %    Basophils 0 0 - 2 %    Absolute Immature Granulocyte 0.12 0.00 - 0.30 k/uL    Segs Absolute 7.02 1.8 - 7.7 k/uL    Absolute Lymph # 4.48 1.0 - 4.8 k/uL    Atypical Lymphocytes Absolute 0.24 k/uL    Absolute Mono # 0.24 0.1 - 0.8 k/uL    Absolute Eos # 0.00 0.0 - 0.4 k/uL    Basophils Absolute 0.00 0.0 - 0.2 k/uL    Morphology Normal    Basic Metabolic Panel    Collection Time: 05/14/20  8:37 PM   Result Value Ref Range    Glucose 111 (H) 70 - 99 mg/dL    BUN 13 6 - 20 mg/dL    CREATININE 0.94 0.70 - 1.20 mg/dL    Bun/Cre Ratio NOT REPORTED 9 - 20    Calcium 8.7 8.6 - 10.4 mg/dL    Sodium 140 135 - 144 mmol/L    Potassium 3.6 (L) 3.7 - 5.3 mmol/L    Chloride 101 98 - 107 mmol/L    CO2 18 (L) 20 - 31 mmol/L    Anion Gap 21 (H) 9 - 17 mmol/L    GFR Non-African American >60 >60 mL/min    GFR African proteinuria: Follow-up urine protein creatinine ratio and lipid profile      DVT ppx: Lovenox 40 units subcutaneous daily  GI ppx: Protonix 40 mg p.o. daily    PT/OT/SW: Consulted  Discharge Planning: Marleny Hernández MD  Internal Medicine Resident, PGY-1  [facility]; [place], [place]  5/15/2020, 1:28 AM  Attending Physician Statement For Internal Medicine  I have discussed the care of Joe Molina, including pertinent history and exam findings,  with the Resident. I have seen and examined the patient with the resident and reviewed the key elements of all parts of the encounter have been performed by me. I agree with the assessment, plan and orders as documented by the resident.     Jose Copeland MD, MRCMAC Gaitan, FACP   5/15/2020 8:33 AM  Internal Medicine and Nephrology

## 2020-05-15 NOTE — ED NOTES
Pt presents to ED via EMS w/ c/o seizure-like activity. Pt was at [facility], states blood sugar may have dropped, family reported seizure-like activity. Pt does not recall episode. Pt has had two episodes similar previously within past five years. Pt is poor historian. Pt placed on monitor, EKG, labs drawn. Will continue to assess.      Joanne Botello RN  05/14/20 2021

## 2020-05-16 VITALS
HEIGHT: 72 IN | DIASTOLIC BLOOD PRESSURE: 75 MMHG | HEART RATE: 99 BPM | WEIGHT: 315 LBS | TEMPERATURE: 98 F | BODY MASS INDEX: 42.66 KG/M2 | SYSTOLIC BLOOD PRESSURE: 161 MMHG | OXYGEN SATURATION: 97 % | RESPIRATION RATE: 16 BRPM

## 2020-05-16 LAB
ABSOLUTE EOS #: 0.05 K/UL (ref 0–0.44)
ABSOLUTE IMMATURE GRANULOCYTE: <0.03 K/UL (ref 0–0.3)
ABSOLUTE LYMPH #: 2.37 K/UL (ref 1.1–3.7)
ABSOLUTE MONO #: 0.54 K/UL (ref 0.1–1.2)
ANION GAP SERPL CALCULATED.3IONS-SCNC: 15 MMOL/L (ref 9–17)
BASOPHILS # BLD: 1 % (ref 0–2)
BASOPHILS ABSOLUTE: 0.05 K/UL (ref 0–0.2)
BUN BLDV-MCNC: 9 MG/DL (ref 6–20)
BUN/CREAT BLD: ABNORMAL (ref 9–20)
CALCIUM SERPL-MCNC: 8.4 MG/DL (ref 8.6–10.4)
CHLORIDE BLD-SCNC: 101 MMOL/L (ref 98–107)
CHOLESTEROL/HDL RATIO: 3.5
CHOLESTEROL: 121 MG/DL
CO2: 24 MMOL/L (ref 20–31)
CREAT SERPL-MCNC: 0.72 MG/DL (ref 0.7–1.2)
DIFFERENTIAL TYPE: ABNORMAL
EOSINOPHILS RELATIVE PERCENT: 1 % (ref 1–4)
GFR AFRICAN AMERICAN: >60 ML/MIN
GFR NON-AFRICAN AMERICAN: >60 ML/MIN
GFR SERPL CREATININE-BSD FRML MDRD: ABNORMAL ML/MIN/{1.73_M2}
GFR SERPL CREATININE-BSD FRML MDRD: ABNORMAL ML/MIN/{1.73_M2}
GLUCOSE BLD-MCNC: 169 MG/DL (ref 70–99)
GLUCOSE BLD-MCNC: 184 MG/DL (ref 75–110)
HCT VFR BLD CALC: 44.3 % (ref 40.7–50.3)
HDLC SERPL-MCNC: 35 MG/DL
HEMOGLOBIN: 14.4 G/DL (ref 13–17)
IMMATURE GRANULOCYTES: 0 %
LACTIC ACID, WHOLE BLOOD: 1 MMOL/L (ref 0.7–2.1)
LDL CHOLESTEROL: 62 MG/DL (ref 0–130)
LYMPHOCYTES # BLD: 33 % (ref 24–43)
MCH RBC QN AUTO: 28.1 PG (ref 25.2–33.5)
MCHC RBC AUTO-ENTMCNC: 32.5 G/DL (ref 28.4–34.8)
MCV RBC AUTO: 86.5 FL (ref 82.6–102.9)
MONOCYTES # BLD: 8 % (ref 3–12)
NRBC AUTOMATED: 0 PER 100 WBC
PDW BLD-RTO: 14.6 % (ref 11.8–14.4)
PLATELET # BLD: 183 K/UL (ref 138–453)
PLATELET ESTIMATE: ABNORMAL
PMV BLD AUTO: 10.7 FL (ref 8.1–13.5)
POTASSIUM SERPL-SCNC: 3.6 MMOL/L (ref 3.7–5.3)
RBC # BLD: 5.12 M/UL (ref 4.21–5.77)
RBC # BLD: ABNORMAL 10*6/UL
SEG NEUTROPHILS: 57 % (ref 36–65)
SEGMENTED NEUTROPHILS ABSOLUTE COUNT: 4.14 K/UL (ref 1.5–8.1)
SODIUM BLD-SCNC: 140 MMOL/L (ref 135–144)
TRIGL SERPL-MCNC: 119 MG/DL
TROPONIN INTERP: ABNORMAL
TROPONIN INTERP: NORMAL
TROPONIN INTERP: NORMAL
TROPONIN T: ABNORMAL NG/ML
TROPONIN T: NORMAL NG/ML
TROPONIN T: NORMAL NG/ML
TROPONIN, HIGH SENSITIVITY: 19 NG/L (ref 0–22)
TROPONIN, HIGH SENSITIVITY: 21 NG/L (ref 0–22)
TROPONIN, HIGH SENSITIVITY: 25 NG/L (ref 0–22)
VLDLC SERPL CALC-MCNC: ABNORMAL MG/DL (ref 1–30)
WBC # BLD: 7.2 K/UL (ref 3.5–11.3)
WBC # BLD: ABNORMAL 10*3/UL

## 2020-05-16 PROCEDURE — 6370000000 HC RX 637 (ALT 250 FOR IP): Performed by: STUDENT IN AN ORGANIZED HEALTH CARE EDUCATION/TRAINING PROGRAM

## 2020-05-16 PROCEDURE — 96372 THER/PROPH/DIAG INJ SC/IM: CPT

## 2020-05-16 PROCEDURE — 83605 ASSAY OF LACTIC ACID: CPT

## 2020-05-16 PROCEDURE — G0378 HOSPITAL OBSERVATION PER HR: HCPCS

## 2020-05-16 PROCEDURE — 2580000003 HC RX 258: Performed by: STUDENT IN AN ORGANIZED HEALTH CARE EDUCATION/TRAINING PROGRAM

## 2020-05-16 PROCEDURE — 36415 COLL VENOUS BLD VENIPUNCTURE: CPT

## 2020-05-16 PROCEDURE — 85025 COMPLETE CBC W/AUTO DIFF WBC: CPT

## 2020-05-16 PROCEDURE — 82947 ASSAY GLUCOSE BLOOD QUANT: CPT

## 2020-05-16 PROCEDURE — 80048 BASIC METABOLIC PNL TOTAL CA: CPT

## 2020-05-16 PROCEDURE — 6360000002 HC RX W HCPCS: Performed by: STUDENT IN AN ORGANIZED HEALTH CARE EDUCATION/TRAINING PROGRAM

## 2020-05-16 PROCEDURE — 80061 LIPID PANEL: CPT

## 2020-05-16 PROCEDURE — 84484 ASSAY OF TROPONIN QUANT: CPT

## 2020-05-16 RX ORDER — LOSARTAN POTASSIUM 100 MG/1
100 TABLET ORAL DAILY
Qty: 30 TABLET | Refills: 3 | Status: SHIPPED | OUTPATIENT
Start: 2020-05-17 | End: 2020-05-16

## 2020-05-16 RX ORDER — DILTIAZEM HYDROCHLORIDE 60 MG/1
60 TABLET, FILM COATED ORAL EVERY 12 HOURS SCHEDULED
Status: DISCONTINUED | OUTPATIENT
Start: 2020-05-16 | End: 2020-05-16 | Stop reason: HOSPADM

## 2020-05-16 RX ORDER — POTASSIUM CHLORIDE 20 MEQ/1
20 TABLET, EXTENDED RELEASE ORAL ONCE
Status: COMPLETED | OUTPATIENT
Start: 2020-05-16 | End: 2020-05-16

## 2020-05-16 RX ORDER — DILTIAZEM HYDROCHLORIDE 60 MG/1
60 TABLET, FILM COATED ORAL EVERY 12 HOURS SCHEDULED
Qty: 120 TABLET | Refills: 3 | Status: SHIPPED | OUTPATIENT
Start: 2020-05-16

## 2020-05-16 RX ORDER — FUROSEMIDE 40 MG/1
40 TABLET ORAL DAILY
Status: DISCONTINUED | OUTPATIENT
Start: 2020-05-16 | End: 2020-05-16 | Stop reason: HOSPADM

## 2020-05-16 RX ORDER — DILTIAZEM HYDROCHLORIDE 60 MG/1
60 TABLET, FILM COATED ORAL EVERY 12 HOURS SCHEDULED
Qty: 120 TABLET | Refills: 3 | Status: SHIPPED | OUTPATIENT
Start: 2020-05-16 | End: 2020-05-16

## 2020-05-16 RX ORDER — LOSARTAN POTASSIUM 100 MG/1
100 TABLET ORAL DAILY
Qty: 30 TABLET | Refills: 3 | Status: SHIPPED | OUTPATIENT
Start: 2020-05-17

## 2020-05-16 RX ADMIN — INSULIN LISPRO 2 UNITS: 100 INJECTION, SOLUTION INTRAVENOUS; SUBCUTANEOUS at 08:15

## 2020-05-16 RX ADMIN — DILTIAZEM HYDROCHLORIDE 60 MG: 60 TABLET, FILM COATED ORAL at 11:18

## 2020-05-16 RX ADMIN — PANTOPRAZOLE SODIUM 40 MG: 40 TABLET, DELAYED RELEASE ORAL at 09:45

## 2020-05-16 RX ADMIN — LOSARTAN POTASSIUM 100 MG: 50 TABLET, FILM COATED ORAL at 08:10

## 2020-05-16 RX ADMIN — Medication 10 ML: at 08:11

## 2020-05-16 RX ADMIN — AMLODIPINE BESYLATE 10 MG: 10 TABLET ORAL at 08:10

## 2020-05-16 RX ADMIN — POTASSIUM CHLORIDE 20 MEQ: 1500 TABLET, EXTENDED RELEASE ORAL at 08:10

## 2020-05-16 RX ADMIN — FUROSEMIDE 40 MG: 40 TABLET ORAL at 08:10

## 2020-05-16 RX ADMIN — ATORVASTATIN CALCIUM 20 MG: 20 TABLET, FILM COATED ORAL at 08:13

## 2020-05-16 RX ADMIN — ENOXAPARIN SODIUM 40 MG: 40 INJECTION SUBCUTANEOUS at 08:13

## 2020-05-16 NOTE — CARE COORDINATION
Discharge [facility] Case Management Department  Written by: Carolyne Dumont RN    Patient Name: Susan House  Attending Provider: No att. providers found  Admit Date: 2020  8:13 PM  MRN: [id number]  Account: [de-identified]                     : 1970  Discharge Date: 2020      Disposition: home    Carolyne Dumont RN

## 2020-05-16 NOTE — PROGRESS NOTES
[facility]  Internal Medicine Teaching Residency Program  Inpatient Daily Progress Note  ______________________________________________________________________________    Patient: Ángela Hendricks  YOB: 1970   [id number]    Acct: [de-identified]     Room: [facility]031-  Admit date: 5/14/2020  Today's date: 05/16/20  Number of days in the hospital: 2    SUBJECTIVE   Admitting Diagnosis: Convulsive syncope  CC: Seizure     Patient was seen and examined at bedside, denied any acute event overnight. He was hypertensive overnight, /98 mm Hg, received 1 dose of I.v. labetalol, currently, /109 mm Hg, morning meds pending, also noted to have protein creatinine ratio 0.86, norvasc added for the above. Labs and imaging reviewed, K 3.6, replaced, MRI unremarkable, EEG unremarkable. ROS:  Constitutional:  negative for chills, fevers, sweats  Respiratory:  negative for cough, dyspnea on exertion, hemoptysis, shortness of breath, wheezing  Cardiovascular:  negative for chest pain, chest pressure/discomfort, lower extremity edema, palpitations  Gastrointestinal:  negative for abdominal pain, constipation, diarrhea, nausea, vomiting  Neurological:  negative for dizziness, headache  BRIEF HISTORY     The patient is a pleasant 52 y.o. male presents with a chief complaint of passing out followed by an episode of seizure while shopping at the grocery store with his daughters today. Patient mentioned that he was walking in the grocery store when he felt lightheaded and passed out after which he had an episode of seizure associated with tongue biting. Patient does not know what happened at the time of seizure. He denies eyes rolling, bowel or bladder incontinence. Patient mentioned that he has had 2 similar episodes of seizure in 2011 and in 2019 for that precipitated by hypoglycemia.   Patient denies trauma to head.     He mentioned that he felt hypoglycemic prior to passing out he drank orange juice. He was never on seizure medications before. Today blood glucose according to . In the ER 99. Patient also desatted to 88% on room air. Was tachypneic and tachycardic with heart rate in 120s. Patient underwent CT PE negative for PE.     He has past medical history of diabetes mellitus, hypertension, hyperlipidemia and bilateral pedal edema.     Patient denies family history of seizures or trauma to head.     Labs: WBC 12.1  CT head: Unremarkable  Prior CT head imaging: Unremarkable  UA negative  Blood tox negative     [id number]     Lactic acid 5.1     Urine analysis: 3+ protein     Patient admitted inpatient for evaluation of syncope/seizures and elevated troponins. OBJECTIVE     Vital Signs:  BP (!) 160/109   Pulse 96   Temp 98 °F (36.7 °C) (Oral)   Resp 16   Ht 6' (1.829 m)   Wt (!) 340 lb (154.2 kg)   SpO2 97%   BMI 46.11 kg/m²     Temp (24hrs), Av.1 °F (36.7 °C), Min:97.7 °F (36.5 °C), Max:98.5 °F (36.9 °C)    No intake/output data recorded. Physical Exam:    Constitutional: This is a well developed, well nourished,  52y.o. year old male who is alert, oriented, cooperative and in no apparent distress. Head:normocephalic and atraumatic. Neck No elevated JVP. Trachea was midline. Respiratory: Chest was symmetrical without dullness to percussion. Breath sounds bilaterally were clear to auscultation. Cardiovascular: Regular without murmur, clicks, gallops or rubs. Abdomen: Slightly rounded and soft without organomegaly. No rebound, rigidity or guarding was appreciated.     Extremities: Bilateral pedal edema present  Neurological/Psychiatric: The patient's general behavior, level of consciousness, thought content and emotional status is normal.           Medications:  Scheduled Medications:    potassium chloride  20 mEq Oral Once    atorvastatin  20 mg Oral Daily    pantoprazole  40 mg Oral Daily    sodium chloride flush  10 mL started on new mediations. The need for this equipment was discussed with the patient and he understands and is in agreement. Carlos Penny MD      Department of Internal Medicine  [facility]         5/16/2020, 10:20 AM    Attending Physician Statement For Internal Medicine  I have discussed the care of Gillis Nails, including pertinent history and exam findings,  with the Resident. I have seen and examined the patient with the resident and reviewed the key elements of all parts of the encounter have been performed by me. I agree with the assessment, plan and orders as documented by the resident.     Radha Menendez MD, MRCP Ether Jose), FACP   5/16/2020 10:21 AM  Internal Medicine and Nephrology

## 2020-05-22 NOTE — DISCHARGE SUMMARY
proteinuria: Secondary to diabetic nephropathy Protein creatinine ratio elevated, started on cardizem 60 mg BID.       Procedures/ Significant Interventions:    None  Consults:     Consults:     Final Specialist Recommendations/Findings:   IP CONSULT TO NEUROLOGY  IP CONSULT TO INTERNAL MEDICINE  IP CONSULT TO CASE MANAGEMENT      Any Hospital Acquired Infections: none    Discharge Functional Status:  stable    DISCHARGE PLAN     Disposition: home    Patient Instructions:   Discharge Medication List as of 5/16/2020 12:44 PM      CONTINUE these medications which have CHANGED    Details   dilTIAZem (CARDIZEM) 60 MG tablet Take 1 tablet by mouth every 12 hours, Disp-120 tablet, R-3Normal      losartan (COZAAR) 100 MG tablet Take 1 tablet by mouth daily, Disp-30 tablet, R-3Normal      metFORMIN (GLUCOPHAGE) 1000 MG tablet Take 1 tablet by mouth 3 times daily, Disp-90 tablet, R-2Normal         CONTINUE these medications which have NOT CHANGED    Details   gemfibrozil (LOPID) 600 MG tablet Take 600 mg by mouth 2 times daily (before meals). furosemide (LASIX) 40 MG tablet Take 40 mg by mouth 2 times daily. omeprazole (PRILOSEC) 20 MG capsule Take 20 mg by mouth daily. atorvastatin (LIPITOR) 20 MG tablet Take 20 mg by mouth daily. Activity: activity as tolerated    Diet: diabetic diet    Follow-up:    [facility]   [address] [place] [address]  [phone]  In 6 weeks  Hospital follow-up    [facility] [address]  [address] [place] [address] Drive  [phone]    In 1 week  In one week after discharge from the hospital.      Patient Instructions: Metformin dose increased from 850 mg TD to 1000 mg TD. Started new medicine cardizem for Blood pressure control and proteinuria, please continue to check your BP at home and follow up with PCP for further medication or dosage adjustment if needed.    Follow up with PCP and Neurology after discharge from the [facility].    Note that over 30 minutes was spent in preparing discharge papers, discussing discharge with patient, medication review, etc.      Farideh Cope MD, MD  Internal Medicine Resident, PGY-1  [facility];  [facility]  5/22/2020, 2:10 PM

## 2020-06-13 PROBLEM — R77.8 ELEVATED TROPONIN: Status: RESOLVED | Noted: 2020-05-14 | Resolved: 2020-06-13

## 2020-07-29 ENCOUNTER — OFFICE VISIT (OUTPATIENT)
Dept: NEUROLOGY | Age: 50
End: 2020-07-29
Payer: COMMERCIAL

## 2020-07-29 VITALS
TEMPERATURE: 97.4 F | HEIGHT: 73 IN | WEIGHT: 315 LBS | DIASTOLIC BLOOD PRESSURE: 96 MMHG | OXYGEN SATURATION: 87 % | SYSTOLIC BLOOD PRESSURE: 165 MMHG | BODY MASS INDEX: 41.75 KG/M2 | HEART RATE: 92 BPM

## 2020-07-29 PROCEDURE — 99215 OFFICE O/P EST HI 40 MIN: CPT | Performed by: STUDENT IN AN ORGANIZED HEALTH CARE EDUCATION/TRAINING PROGRAM

## 2020-07-29 RX ORDER — BLOOD-GLUCOSE METER
1 EACH MISCELLANEOUS DAILY
COMMUNITY
Start: 2019-10-16

## 2020-07-29 RX ORDER — ACETAMINOPHEN 325 MG/1
325 TABLET ORAL DAILY
COMMUNITY
Start: 2020-06-22

## 2020-07-29 RX ORDER — CETIRIZINE HYDROCHLORIDE 10 MG/1
10 TABLET ORAL DAILY
COMMUNITY
Start: 2020-06-22

## 2020-07-29 ASSESSMENT — ENCOUNTER SYMPTOMS
COUGH: 0
ABDOMINAL PAIN: 0
PHOTOPHOBIA: 0
CONSTIPATION: 0
VOMITING: 0
NAUSEA: 0
EYE PAIN: 0
SHORTNESS OF BREATH: 0
DIARRHEA: 0

## 2020-07-29 NOTE — PROGRESS NOTES
[address] [place] [address], [facility], [facility], [address], [address] [place] [address]  P: [phone]  F: [phone]    NEUROLOGY CLINIC NOTE     PATIENT NAME: Kary Anaya  PATIENT MRN: [id number]  PRIMARY CARE PHYSICIAN: Slade Luciano    HPI:      Kary Anaya is a 52 y.o.  male with PMH significant for diabetes and history of syncopal/convulsive syncopal episode associated with hypoglycemia was seen in the clinic for post hospital follow-up. History obtained from Patient    Patient was admitted in May 2020 with episode of lightheadedness, blurry vision associated with loss of consciousness followed by generalized shaking and tongue bite. Denies any bowel bladder incontinence. He had history of similar episodes of passing out with shaking activity associated with hypoglycemia  Patient drank orange juice prior to arrival, his blood sugar on presentation was 99. He was evaluated by neurology team.  MRI of the brain was unremarkable  EEG did not show any epileptiform discharges  Orthostats were negative  2D echo showed EF of 55%  No AED was started at that time, as his episode was considered to be provoked secondary to hypoglycemia. Patient was then discharged once his medical condition was stabilized    Since discharge patient denies any recurrence of episodes of passing out or convulsive syncope. Denies any active neurologic concerns, denies any headaches or vision changes or focal tingling numbness or muscle weakness or speech difficulty. He is following up with his PCP for his diabetes management.   Denies any other concerns during this visit    PATIENT HISTORY:     Past Medical History:   Diagnosis Date    Type II or unspecified type diabetes mellitus without mention of complication, not stated as uncontrolled         Past Surgical History:   Procedure Laterality Date    ABDOMINAL EXPLORATION SURGERY      APPENDECTOMY          Social History     Socioeconomic History    Marital status:      Spouse name: Not on file    Number of children: Not on file    Years of education: Not on file    Highest education level: Not on file   Occupational History    Not on file   Social Needs    Financial resource strain: Not on file    Food insecurity     Worry: Not on file     Inability: Not on file    Transportation needs     Medical: Not on file     Non-medical: Not on file   Tobacco Use    Smoking status: Never Smoker    Smokeless tobacco: Never Used   Substance and Sexual Activity    Alcohol use: No    Drug use: Never    Sexual activity: Not on file   Lifestyle    Physical activity     Days per week: Not on file     Minutes per session: Not on file    Stress: Not on file   Relationships    Social connections     Talks on phone: Not on file     Gets together: Not on file     Attends Evangelical service: Not on file     Active member of club or organization: Not on file     Attends meetings of clubs or organizations: Not on file     Relationship status: Not on file    Intimate partner violence     Fear of current or ex partner: Not on file     Emotionally abused: Not on file     Physically abused: Not on file     Forced sexual activity: Not on file   Other Topics Concern    Not on file   Social History Narrative    Not on file        Current Outpatient Medications   Medication Sig Dispense Refill    acetaminophen (TYLENOL) 325 MG tablet Take 325 mg by mouth daily      Insulin Pen Needle 31G X 5 MM MISC Inject 1 each into the skin 3 times daily      cetirizine (ZYRTEC) 10 MG tablet Take 10 mg by mouth daily      Blood Glucose Monitoring Suppl ([employer] BLOOD GLUCOSE METER) w/Device KIT 1 Device by Does not apply route daily      dilTIAZem (CARDIZEM) 60 MG tablet Take 1 tablet by mouth every 12 hours 120 tablet 3    losartan (COZAAR) 100 MG tablet Take 1 tablet by mouth daily 30 tablet 3    metFORMIN (GLUCOPHAGE) 1000 MG tablet Take 1 tablet by mouth 3 times daily 90 tablet 2    furosemide (LASIX) 40 MG tablet Take 40 mg by mouth 2 times daily.  omeprazole (PRILOSEC) 20 MG capsule Take 20 mg by mouth daily.  atorvastatin (LIPITOR) 20 MG tablet Take 20 mg by mouth daily.  gemfibrozil (LOPID) 600 MG tablet Take 600 mg by mouth 2 times daily (before meals). No current facility-administered medications for this visit. No Known Allergies     REVIEW OF SYSTEMS:     Review of Systems   Constitutional: Negative for appetite change, chills, fever and unexpected weight change. Eyes: Negative for photophobia, pain and visual disturbance. Respiratory: Negative for cough and shortness of breath. Cardiovascular: Negative for chest pain and palpitations. Gastrointestinal: Negative for abdominal pain, constipation, diarrhea, nausea and vomiting. Endocrine: Negative for polydipsia and polyuria. Genitourinary: Negative for difficulty urinating, dysuria and hematuria. Skin: Negative for pallor and rash. Neurological: Positive for syncope (Syncopal episode associated with hypoglycemia). Negative for dizziness, tremors, seizures, facial asymmetry, speech difficulty, weakness, light-headedness, numbness and headaches. Psychiatric/Behavioral: Negative for behavioral problems and hallucinations. VITALS  BP (!) 165/96 (Site: Left Lower Arm, Position: Sitting, Cuff Size: Medium Adult)   Pulse 92   Temp 97.4 °F (36.3 °C) (Temporal)   Ht 6' 1\" (1.854 m)   Wt (!) 320 lb (145.2 kg)   SpO2 (!) 87%   BMI 42.22 kg/m²      PHYSICAL EXAMINATION:     Constitutional: Well developed, well nourished and in no acute distress. Head:  normocephalic, atraumatic. Neck: supple, no carotid bruits, thyroid not palpable  Respiratory: Clear to auscultation bilaterally with no use of accessory muscles during respiration. Cardiovascular: normal rate, regular rhythm, no murmur, gallop, rub.   Abdomen: Soft, nontender, nondistended, normal bowel sounds, no hepatomegaly or splenomegaly  Extremities:  peripheral pulses palpable, no pedal edema or calf pain with palpation  Psych: normal affect      NEUROLOGICAL EXAMINATION:     Mental status   Alert and oriented; intact memory with no confusion, speech or language problems; no hallucinations or delusions     Cranial nerves   II - visual fields intact to confrontation                                                III, IV, VI - extra-ocular muscles full: no pupillary defect; no ELISHA, no nystagmus, no ptosis   V - normal facial sensation                                                               VII - normal facial symmetry                                                             VIII - intact hearing                                                                             IX, X - symmetrical palate                                                                  XI - symmetrical shoulder shrug                                                       XII - midline tongue without atrophy or fasciculation     Motor function  Normal muscle bulk and tone  Muscle strength: normal power 5/5     Sensory function Intact to touch in bilateral upper and lower extremities. Cerebellar Intact fine motor movement. No involuntary movements or tremors     Reflex function Intact 2+ DTR and symmetric. Negative Babinski     Gait                  Normal station and gait           PRIOR TESTS AND IMAGING: Following images and Labs were reviewed by the examiner     EEG 5/15/2020  Clinical correlation  This EEG was normal. No focal or epileptiform abnormalities were seen. MRI brain with and without contrast 5/15/2020  Impression    Unremarkable pre and post-contrast MRI of the brain.         Mild chronic sinusitis involving the maxillary sinuses. CT head without contrast 5/14/2020  Impression    No acute intracranial findings.           CT head without contrast 3/21/2012  IMPRESSION:   NORMAL CT SCAN OF THE BRAIN.    ASSESSMENT / PLAN:     Dianna Saini is a 52 y.o. right handed  male with PMH significant for diabetes and history of syncopal/convulsive syncopal episode associated with hypoglycemia was seen in the clinic for post hospital follow-up. · Episode of loss of consciousness in May 2020, possible convulsive syncope secondary to hypoglycemia. Similar episodes of passing out spells with hypoglycemia in the past.  Patient was evaluated by neurology team.  MRI brain was unremarkable. EEG did not show an epileptiform discharges. 2D echo with EF of 55%. · History of diabetes      PLAN:   -Patient denies any recurrence of similar passing out spells since discharge from the hospital.  As per patient his syncopal episodes are always associated with hypoglycemia. Denies any active neurologic concerns at present. Discussed MRI brain and EEG findings with the patient.  -Discussed with patient that if in case he has similar episodes in future, may or may not be related to hypoglycemia will recommend long-term video EEG monitoring at that time for further evaluation. Patient voiced understanding  -Discussed spell precautions with the patient including driving restrictions. - Follow up in the clinic in 6 months  - Instructed patient to call the clinic if symptoms worsen or develop any new symptoms. I have spent 45 minutes face to face with the patient more than 50% of this time was spent counseling and coordinating care.       Electronically signed by Nay Darling MD on 7/29/2020 at 5:30 PM